# Patient Record
(demographics unavailable — no encounter records)

---

## 2025-03-06 NOTE — HISTORY OF PRESENT ILLNESS
[FreeTextEntry1] : Thank you for consult.  Patient is a 58 y/o female with a PMHx of hypertension, hypercholesterolemia, Uterine Leiomyoma, Lt Nephrolithiasis, Scalp Mass s/p excision, and Thyroid Nodule who presents for EGD-Colonoscopy consult. Pt is currently c/o on and off heartburn x several years. Last colonoscopy was 2/6/2017, which was unremarkable except for Internal Hemorrhoids. Pt has never had an EGD.